# Patient Record
Sex: FEMALE | Race: OTHER
[De-identification: names, ages, dates, MRNs, and addresses within clinical notes are randomized per-mention and may not be internally consistent; named-entity substitution may affect disease eponyms.]

---

## 2017-04-03 ENCOUNTER — HOSPITAL ENCOUNTER (OUTPATIENT)
Dept: HOSPITAL 53 - M RAD | Age: 47
End: 2017-04-03
Attending: OTOLARYNGOLOGY
Payer: COMMERCIAL

## 2017-04-03 DIAGNOSIS — Z85.850: ICD-10-CM

## 2017-04-03 DIAGNOSIS — R22.1: Primary | ICD-10-CM

## 2017-04-03 NOTE — REP
MR NECK WITHOUT AND WITH CONTRAST:

 

HISTORY:  Thyroid sarcoma.

 

CONTRAST:  ProHance 27 mL.

 

The naso-, galina-, and hypopharynx, larynx and subglottic trachea are normal in

appearance. The salivary glands are normal.  The thyroid gland is small in size.

Small lymph nodes less than 1 cm in size are present in the internal jugular

chains, posterior triangles, and submandibular areas.  Minimal degenerative

change is present in the cervical spine.  The lung apices are clear. The

visualized sinuses are clear.

 

IMPRESSION:

 

There is no neck mass or adenopathy.

 

 

Signed by

Gagan Mcwilliams MD 04/03/2017 02:31 P

## 2017-06-22 ENCOUNTER — HOSPITAL ENCOUNTER (OUTPATIENT)
Dept: HOSPITAL 53 - M RAD | Age: 47
End: 2017-06-22
Attending: OTOLARYNGOLOGY
Payer: COMMERCIAL

## 2017-06-22 DIAGNOSIS — R22.1: Primary | ICD-10-CM

## 2017-06-23 NOTE — REP
Clinical:  Palpable mass.

 

Technique:  Real time gray scale and color Doppler evaluation using linear high

frequency transducer.

 

Findings:

The patient is noted to be status post thyroidectomy.  Directed ultrasound

examination along the left posterior triangle of the neck demonstrates a complex

hypoechoic collection/nodule with calcifications and mild vascularity measuring

2.1 x 1.0 x 0.6 cm.

 

Impression:

Hypoechoic complex lesion in the left posterior triangle at the site of palpable

mass is otherwise nonspecific by ultrasound.  Consider contrast enhanced CT or

MRI for further investigation.

 

 

Signed by

Terrell Alejandro MD 06/23/2017 07:25 A

## 2019-01-02 ENCOUNTER — HOSPITAL ENCOUNTER (OUTPATIENT)
Dept: HOSPITAL 53 - M SDC | Age: 49
Discharge: HOME | End: 2019-01-02
Attending: OPHTHALMOLOGY
Payer: COMMERCIAL

## 2019-01-02 VITALS — BODY MASS INDEX: 39.08 KG/M2 | WEIGHT: 249 LBS | HEIGHT: 67 IN

## 2019-01-02 VITALS — SYSTOLIC BLOOD PRESSURE: 148 MMHG | DIASTOLIC BLOOD PRESSURE: 82 MMHG

## 2019-01-02 DIAGNOSIS — Z72.0: ICD-10-CM

## 2019-01-02 DIAGNOSIS — L82.0: Primary | ICD-10-CM

## 2019-01-02 DIAGNOSIS — L30.9: ICD-10-CM

## 2019-01-02 DIAGNOSIS — E66.9: ICD-10-CM

## 2019-01-02 DIAGNOSIS — Z90.710: ICD-10-CM

## 2019-01-02 DIAGNOSIS — Z79.899: ICD-10-CM

## 2019-01-02 DIAGNOSIS — E03.9: ICD-10-CM

## 2019-01-02 PROCEDURE — 88305 TISSUE EXAM BY PATHOLOGIST: CPT

## 2019-01-02 PROCEDURE — 67840 REMOVE EYELID LESION: CPT

## 2019-01-09 NOTE — RO
DATE OF PROCEDURE:  01/02/2019

 

PREOPERATIVE DIAGNOSIS: Lid lesion left lower lid consistent with chalazion.

 

POSTOPERATIVE DIAGNOSIS: Lid lesion left lower lid consistent with chalazion.

 

PROCEDURE: Excision of lid lesion 7.7 mm left lower lid.

 

INDICATION: Discomfort and growth of the lesion.

 

SURGEON: Dom Almodovar DO

 

ASSISTANT: None.

 

ANESTHESIA: 2% Lidocaine with epinephrine.

 

SPECIMENS: Sent to pathology.

 

ESTIMATED BLOOD LOSS: Minimal.

 

COMPLICATION: None.

 

PROCEDURE IN DETAIL: After obtaining informed consent the patient was taken to

the operating room and a time out was performed confirming the correct patient

and operative site. The lower lid was injected with 2% lidocaine with epinephrine

and the lesion was excised in toto and sent to pathology. The patient tolerated

the procedure well and returned the recovery room in excellent condition. She

will followup in the office.